# Patient Record
Sex: MALE | Race: WHITE | NOT HISPANIC OR LATINO | Employment: FULL TIME | URBAN - NONMETROPOLITAN AREA
[De-identification: names, ages, dates, MRNs, and addresses within clinical notes are randomized per-mention and may not be internally consistent; named-entity substitution may affect disease eponyms.]

---

## 2017-05-05 ENCOUNTER — APPOINTMENT (OUTPATIENT)
Dept: MEDICAL GROUP | Facility: PHYSICIAN GROUP | Age: 27
End: 2017-05-05
Payer: COMMERCIAL

## 2017-05-12 ENCOUNTER — OFFICE VISIT (OUTPATIENT)
Dept: MEDICAL GROUP | Facility: PHYSICIAN GROUP | Age: 27
End: 2017-05-12
Payer: COMMERCIAL

## 2017-05-12 VITALS
BODY MASS INDEX: 24.55 KG/M2 | SYSTOLIC BLOOD PRESSURE: 132 MMHG | TEMPERATURE: 98.6 F | DIASTOLIC BLOOD PRESSURE: 76 MMHG | HEART RATE: 86 BPM | HEIGHT: 68 IN | OXYGEN SATURATION: 97 % | RESPIRATION RATE: 16 BRPM | WEIGHT: 162 LBS

## 2017-05-12 DIAGNOSIS — K21.00 REFLUX ESOPHAGITIS: ICD-10-CM

## 2017-05-12 DIAGNOSIS — F33.2 SEVERE EPISODE OF RECURRENT MAJOR DEPRESSIVE DISORDER, WITHOUT PSYCHOTIC FEATURES (HCC): ICD-10-CM

## 2017-05-12 DIAGNOSIS — F17.200 TOBACCO USE DISORDER: ICD-10-CM

## 2017-05-12 DIAGNOSIS — F41.9 ANXIETY: ICD-10-CM

## 2017-05-12 PROBLEM — F32.A DEPRESSION: Status: ACTIVE | Noted: 2017-05-12

## 2017-05-12 PROCEDURE — 99214 OFFICE O/P EST MOD 30 MIN: CPT | Performed by: NURSE PRACTITIONER

## 2017-05-12 RX ORDER — HYDROXYZINE HYDROCHLORIDE 25 MG/1
25 TABLET, FILM COATED ORAL 3 TIMES DAILY PRN
Qty: 30 TAB | Refills: 1 | Status: SHIPPED | OUTPATIENT
Start: 2017-05-12 | End: 2017-08-17 | Stop reason: SDUPTHER

## 2017-05-12 RX ORDER — OMEPRAZOLE 20 MG/1
20 CAPSULE, DELAYED RELEASE ORAL DAILY
Qty: 90 CAP | Refills: 1 | Status: SHIPPED | OUTPATIENT
Start: 2017-05-12

## 2017-05-12 RX ORDER — RANITIDINE 150 MG/1
150 TABLET ORAL 2 TIMES DAILY
COMMUNITY

## 2017-05-12 RX ORDER — CITALOPRAM HYDROBROMIDE 10 MG/1
10 TABLET ORAL DAILY
Qty: 90 TAB | Refills: 0 | Status: SHIPPED | OUTPATIENT
Start: 2017-05-12 | End: 2017-08-17 | Stop reason: SDUPTHER

## 2017-05-12 NOTE — ASSESSMENT & PLAN NOTE
This is chronic, uncontrolled. However, he has significantly weaned himself down to a pack of cigarettes that last him 4 days. He is not interested and Chantix or any other tobacco cessation product as he feels he can do this on his own with weaning down. He does understand the risks associated with ongoing tobacco use and we'll continue to wean down until he completely stops. I told him that if he finds he has difficulty completely stopping, he can come back and see me.

## 2017-05-12 NOTE — ASSESSMENT & PLAN NOTE
This is chronic in nature, uncontrolled. Patient has symptoms consistent with reflux esophagitis. He has been taking over-the-counter Zantac 150 mg, but not consistently. He also takes Tums when needed. He has been suffering from uncontrolled depression lately and feels that much of his symptoms have been exacerbated from this as well. Upon examination he does have epigastric tenderness does complain of heartburn at times.  Going to have him switch to omeprazole, 20 mg daily. He is instructed to take this on an empty stomach first thing in the morning. He was also given printed education material on foods and activities to avoid for reflux. We will see him back in 2 months for follow-up.

## 2017-05-12 NOTE — ASSESSMENT & PLAN NOTE
Patient does have past history of depression, treated and adolescence. He was on citalopram, but cannot remember whether or not it was infected. During his adolescence, his older brother who is a father figure to him committed suicide, and this significantly affected him. It has been a number years since he has been treated for this. He has intention yes to both questions on the PHQ-2 questionnaire. Reports that this is also causing gastrointestinal symptoms and he is not eating well. In fact he states he is losing weight because he just simply does not have an appetite.  He has had suicidal ideations in the past, but does not have any now. More than anything, he states he just wants to be alone and away from other people. He also denies homicidal ideations, hallucinations, racing thoughts and flights of ideas. He is open to counseling, referral has been placed. I will have him start on citalopram again, 10 mg daily. I did discuss with him the risks, benefits and side effects of medication. He was also reminded the medication takes 4-6 weeks to become fully effective. In the interim I will have him take hydroxyzine 25 mg, one pill up to 3 times a day as needed for breakthrough anxiety. He can take the last pill of the day for insomnia at bedtime. He was also counseled on the risks, benefits and side effects of this medication, and warned to watch for sedation. He is not to drive with this medication or do any other activities that require mental acuity. He is follow-up with me in 2 months.

## 2017-05-12 NOTE — PATIENT INSTRUCTIONS
Referral for counseling    Start Celexa 10 mg daily    Hydroxyzine 25 mg 1 pill up to 3 times as needed for anxiety--take last one of the day for insomnia    Continue to work on tobacco cessation    Omeprazole 20 mg daily--take on empty stomach first thing in am    Follow up with me in 2 months    Gastroesophageal Reflux Disease, Adult  Gastroesophageal reflux disease (GERD) happens when acid from your stomach flows up into the esophagus. When acid comes in contact with the esophagus, the acid causes soreness (inflammation) in the esophagus. Over time, GERD may create small holes (ulcers) in the lining of the esophagus.  CAUSES   · Increased body weight. This puts pressure on the stomach, making acid rise from the stomach into the esophagus.  · Smoking. This increases acid production in the stomach.  · Drinking alcohol. This causes decreased pressure in the lower esophageal sphincter (valve or ring of muscle between the esophagus and stomach), allowing acid from the stomach into the esophagus.  · Late evening meals and a full stomach. This increases pressure and acid production in the stomach.  · A malformed lower esophageal sphincter.  Sometimes, no cause is found.  SYMPTOMS   · Burning pain in the lower part of the mid-chest behind the breastbone and in the mid-stomach area. This may occur twice a week or more often.  · Trouble swallowing.  · Sore throat.  · Dry cough.  · Asthma-like symptoms including chest tightness, shortness of breath, or wheezing.  DIAGNOSIS   Your caregiver may be able to diagnose GERD based on your symptoms. In some cases, X-rays and other tests may be done to check for complications or to check the condition of your stomach and esophagus.  TREATMENT   Your caregiver may recommend over-the-counter or prescription medicines to help decrease acid production. Ask your caregiver before starting or adding any new medicines.   HOME CARE INSTRUCTIONS   · Change the factors that you can control.  Ask your caregiver for guidance concerning weight loss, quitting smoking, and alcohol consumption.  · Avoid foods and drinks that make your symptoms worse, such as:  ¨ Caffeine or alcoholic drinks.  ¨ Chocolate.  ¨ Peppermint or mint flavorings.  ¨ Garlic and onions.  ¨ Spicy foods.  ¨ Citrus fruits, such as oranges, rocco, or limes.  ¨ Tomato-based foods such as sauce, chili, salsa, and pizza.  ¨ Fried and fatty foods.  · Avoid lying down for the 3 hours prior to your bedtime or prior to taking a nap.  · Eat small, frequent meals instead of large meals.  · Wear loose-fitting clothing. Do not wear anything tight around your waist that causes pressure on your stomach.  · Raise the head of your bed 6 to 8 inches with wood blocks to help you sleep. Extra pillows will not help.  · Only take over-the-counter or prescription medicines for pain, discomfort, or fever as directed by your caregiver.  · Do not take aspirin, ibuprofen, or other nonsteroidal anti-inflammatory drugs (NSAIDs).  SEEK IMMEDIATE MEDICAL CARE IF:   · You have pain in your arms, neck, jaw, teeth, or back.  · Your pain increases or changes in intensity or duration.  · You develop nausea, vomiting, or sweating (diaphoresis).  · You develop shortness of breath, or you faint.  · Your vomit is green, yellow, black, or looks like coffee grounds or blood.  · Your stool is red, bloody, or black.  These symptoms could be signs of other problems, such as heart disease, gastric bleeding, or esophageal bleeding.  MAKE SURE YOU:   · Understand these instructions.  · Will watch your condition.  · Will get help right away if you are not doing well or get worse.     This information is not intended to replace advice given to you by your health care provider. Make sure you discuss any questions you have with your health care provider.     Document Released: 09/27/2006 Document Revised: 01/08/2016 Document Reviewed: 04/13/2016  [employer] Patient Education  ©2016 [employer]

## 2017-05-12 NOTE — PROGRESS NOTES
Chief Complaint   Patient presents with   • Loss of Appetite     diarrhea x3 years intermittent, heartburn   • Panic Attack     anxiety x 3 years, getting worse         This is a 26 y.o.male patient that presents today with the following: Follow-up visit, acute and chronic conditions    Tobacco use disorder  This is chronic, uncontrolled. However, he has significantly weaned himself down to a pack of cigarettes that last him 4 days. He is not interested and Chantix or any other tobacco cessation product as he feels he can do this on his own with weaning down. He does understand the risks associated with ongoing tobacco use and we'll continue to wean down until he completely stops. I told him that if he finds he has difficulty completely stopping, he can come back and see me.    Reflux esophagitis  This is chronic in nature, uncontrolled. Patient has symptoms consistent with reflux esophagitis. He has been taking over-the-counter Zantac 150 mg, but not consistently. He also takes Tums when needed. He has been suffering from uncontrolled depression lately and feels that much of his symptoms have been exacerbated from this as well. Upon examination he does have epigastric tenderness does complain of heartburn at times.  Going to have him switch to omeprazole, 20 mg daily. He is instructed to take this on an empty stomach first thing in the morning. He was also given printed education material on foods and activities to avoid for reflux. We will see him back in 2 months for follow-up.    Depression  Patient does have past history of depression, treated and adolescence. He was on citalopram, but cannot remember whether or not it was infected. During his adolescence, his older brother who is a father figure to him committed suicide, and this significantly affected him. It has been a number years since he has been treated for this. He has intention yes to both questions on the PHQ-2 questionnaire. Reports that this is also  causing gastrointestinal symptoms and he is not eating well. In fact he states he is losing weight because he just simply does not have an appetite.  He has had suicidal ideations in the past, but does not have any now. More than anything, he states he just wants to be alone and away from other people. He also denies homicidal ideations, hallucinations, racing thoughts and flights of ideas. He is open to counseling, referral has been placed. I will have him start on citalopram again, 10 mg daily. I did discuss with him the risks, benefits and side effects of medication. He was also reminded the medication takes 4-6 weeks to become fully effective. In the interim I will have him take hydroxyzine 25 mg, one pill up to 3 times a day as needed for breakthrough anxiety. He can take the last pill of the day for insomnia at bedtime. He was also counseled on the risks, benefits and side effects of this medication, and warned to watch for sedation. He is not to drive with this medication or do any other activities that require mental acuity. He is follow-up with me in 2 months.    Anxiety  See additional notes on depression.      No visits with results within 1 Month(s) from this visit.  Latest known visit with results is:    Admission on 01/12/2016, Discharged on 01/12/2016   Component Date Value   • WBC 01/12/2016 10.6    • RBC 01/12/2016 5.15    • Hemoglobin 01/12/2016 15.8    • Hematocrit 01/12/2016 45.5    • MCV 01/12/2016 88.3    • MCH 01/12/2016 30.7    • MCHC 01/12/2016 34.7    • RDW 01/12/2016 43.2    • Platelet Count 01/12/2016 216    • MPV 01/12/2016 10.0    • Neutrophils-Polys 01/12/2016 74.10*   • Lymphocytes 01/12/2016 19.60*   • Monocytes 01/12/2016 4.90    • Eosinophils 01/12/2016 0.40    • Basophils 01/12/2016 0.60    • Immature Granulocytes 01/12/2016 0.40    • Nucleated RBC 01/12/2016 0.00    • Neutrophils (Absolute) 01/12/2016 7.83*   • Lymphs (Absolute) 01/12/2016 2.07    • Monos (Absolute) 01/12/2016  0.52    • Eos (Absolute) 01/12/2016 0.04    • Baso (Absolute) 01/12/2016 0.06    • Immature Granulocytes (a* 01/12/2016 0.04    • NRBC (Absolute) 01/12/2016 0.00    • Sodium 01/12/2016 136    • Potassium 01/12/2016 3.8    • Chloride 01/12/2016 105    • Co2 01/12/2016 22    • Anion Gap 01/12/2016 9.0    • Glucose 01/12/2016 92    • Bun 01/12/2016 13    • Creatinine 01/12/2016 0.91    • Calcium 01/12/2016 9.8    • AST(SGOT) 01/12/2016 16    • ALT(SGPT) 01/12/2016 21    • Alkaline Phosphatase 01/12/2016 58    • Total Bilirubin 01/12/2016 0.5    • Albumin 01/12/2016 5.1*   • Total Protein 01/12/2016 8.0    • Globulin 01/12/2016 2.9    • A-G Ratio 01/12/2016 1.8    • Lipase 01/12/2016 12    • GFR If  01/12/2016 >60    • GFR If Non  Ameri* 01/12/2016 >60    • POC Color 01/12/2016 Yellow    • POC Appearance 01/12/2016 Clear    • POC Glucose 01/12/2016 Negative    • POC Ketones 01/12/2016 Negative    • POC Specific Gravity 01/12/2016 1.025    • POC Blood 01/12/2016 Negative    • POC Urine PH 01/12/2016 5.5    • POC Protein 01/12/2016 Negative    • POC Nitrites 01/12/2016 Negative    • POC Leukocyte Esterase 01/12/2016 Negative          clinical course has been stable    Past Medical History   Diagnosis Date   • Depression    • Reflux esophagitis        Past Surgical History   Procedure Laterality Date   • Other       none reported       Family History   Problem Relation Age of Onset   • Cancer Mother    • Heart Disease Mother    • Heart Attack Father        Review of patient's allergies indicates no known allergies.    Current Outpatient Prescriptions Ordered in [facility]   Medication Sig Dispense Refill   • citalopram (CELEXA) 10 MG tablet Take 1 Tab by mouth every day. 90 Tab 0   • hydrOXYzine (ATARAX) 25 MG Tab Take 1 Tab by mouth 3 times a day as needed (anxiety). 30 Tab 1   • omeprazole (PRILOSEC) 20 MG delayed-release capsule Take 1 Cap by mouth every day. 90 Cap 1   • ibuprofen (MOTRIN) 200 MG Tab  "Take 200 mg by mouth every 6 hours as needed.     • calcium carbonate (TUMS) 500 MG Chew Tab Take 500 mg by mouth every day.     • ranitidine (ZANTAC) 150 MG Tab Take 150 mg by mouth 2 times a day.       No current [facility]-ordered facility-administered medications on file.       Constitutional ROS: No unexpected change in weight, No weakness, Positive for weight loss, reported, per history of present illness  Pulmonary ROS: No chronic cough, sputum, or hemoptysis, No shortness of breath, No recent change in breathing, Positive for smoker, current every day  Cardiovascular ROS: No chest pain, No edema, No palpitations  Gastrointestinal ROS: Positive per history of present illness  Musculoskeletal/Extremities ROS: No clubbing, No peripheral edema, No pain, redness or swelling on the joints  Neurologic ROS: Normal development, No seizures, No weakness  Psychiatric ROS: Positive per history of present illness    Physical exam:  /76 mmHg  Pulse 86  Temp(Src) 37 °C (98.6 °F)  Resp 16  Ht 1.727 m (5' 7.99\")  Wt 73.483 kg (162 lb)  BMI 24.64 kg/m2  SpO2 97%  General Appearance: Young male, alert, no distress, well-nourished, well-groomed  Skin: Skin color, texture, turgor normal. No rashes or lesions.  Lungs: negative findings: normal respiratory rate and rhythm, lungs clear to auscultation  Heart: negative. RRR without murmur, gallop, or rubs.  No ectopy.  Abdomen: Abdomen soft, mild tenderness with palpation to epigastric area. BS normal. No masses,  No organomegaly  Musculoskeletal: negative findings: no erythema, induration, or nodules, ROM of all joints is normal, no evidence of joint instability, strength normal, no deformities present  Neurologic: intact, oriented, mood appropriate, judgment intact. Cranial nerves II through XII grossly intact    Medical decision making/discussion: Patient here for symptoms of depression, uncontrolled. We will start him on citalopram, 10 mg daily along with hydroxyzine 25 " mg one pill up to 3 times a day as needed for breakthrough anxiety as well as insomnia. He was counseled on risks, benefits and side effects of both medications. He was told that citalopram can take 4-6 weeks to become fully effective. He is to continue with healthy living, regular physical activity and healthy diet. We will place referral for counseling as well. He is to continue with decreasing his tobacco use until he completely stops. He can come back persistence if needed. For reflux, I am going to have him switch to Prilosec, 20 mg daily. He is to take this on an empty stomach first thing in the morning. He was given printed educational material on foods and activities to avoid. I will see him back in 2 months    Delfino was seen today for loss of appetite and panic attack.    Diagnoses and all orders for this visit:    Severe episode of recurrent major depressive disorder, without psychotic features (CMS-HCC)  -     REFERRAL TO PSYCHOLOGY  -     citalopram (CELEXA) 10 MG tablet; Take 1 Tab by mouth every day.    Tobacco use disorder    Anxiety  -     REFERRAL TO PSYCHOLOGY  -     citalopram (CELEXA) 10 MG tablet; Take 1 Tab by mouth every day.  -     hydrOXYzine (ATARAX) 25 MG Tab; Take 1 Tab by mouth 3 times a day as needed (anxiety).    Reflux esophagitis    Other orders  -     omeprazole (PRILOSEC) 20 MG delayed-release capsule; Take 1 Cap by mouth every day.          Please note that this dictation was created using voice recognition software. I have made every reasonable attempt to correct obvious errors, but I expect that there are errors of grammar and possibly content that I did not discover before finalizing the note.

## 2017-05-26 NOTE — MR AVS SNAPSHOT
"        Delfino Daniel   2017 11:00 AM   Office Visit   MRN: [id number]    Department:  [facility]   Dept Phone:  [phone]    Description:  Male : 1990   Provider:  RIDGE Teran           Reason for Visit     Loss of Appetite diarrhea x3 years intermittent, heartburn    Panic Attack anxiety x 3 years, getting worse      Allergies as of 2017     No Known Allergies      You were diagnosed with     Tobacco use disorder   [305.1.ICD-9-CM]       Severe episode of recurrent major depressive disorder, without psychotic features ([facility])   [9398342]       Anxiety   [562973]       Reflux esophagitis   [530.11.ICD-9-CM]         Vital Signs     Blood Pressure Pulse Temperature Respirations Height Weight    132/76 mmHg 86 37 °C (98.6 °F) 16 1.727 m (5' 7.99\") 73.483 kg (162 lb)    Body Mass Index Oxygen Saturation Smoking Status             24.64 kg/m2 97% Current Every Day Smoker         Basic Information     Date Of Birth Sex Race Ethnicity Preferred Language    1990 Male White Non- English      Problem List              ICD-10-CM Priority Class Noted - Resolved    Family history of early CAD Z82.49   3/9/2016 - Present    Wellness examination Z00.00   3/9/2016 - Present    Tobacco use disorder F17.200   2017 - Present    Depression F32.9   2017 - Present    Anxiety F41.9   2017 - Present      Health Maintenance        Date Due Completion Dates    IMM HEP B VACCINE (1 of 3 - Primary Series) 1990 ---    IMM HEP A VACCINE (1 of 2 - Standard Series) 1991 ---    IMM HPV VACCINE (1 of 3 - Male 3 Dose Series) 2001 ---    IMM VARICELLA (CHICKENPOX) VACCINE (1 of 2 - 2 Dose Adolescent Series) 2003 ---    IMM DTaP/Tdap/Td Vaccine (1 - Tdap) 2009 ---            Current Immunizations     Influenza Vaccine Quad Inj (Pf) 3/9/2016 11:30 AM      Below and/or attached are the medications your provider expects you to take. Review all of your home " medications and newly ordered medications with your provider and/or pharmacist. Follow medication instructions as directed by your provider and/or pharmacist. Please keep your medication list with you and share with your provider. Update the information when medications are discontinued, doses are changed, or new medications (including over-the-counter products) are added; and carry medication information at all times in the event of emergency situations     Allergies:  No Known Allergies          Medications  Valid as of: May 12, 2017 - 11:43 AM    Generic Name Brand Name Tablet Size Instructions for use    Calcium Carbonate Antacid (Chew Tab) TUMS 500 MG Take 500 mg by mouth every day.        Citalopram Hydrobromide (Tab) CELEXA 10 MG Take 1 Tab by mouth every day.        HydrOXYzine HCl (Tab) ATARAX 25 MG Take 1 Tab by mouth 3 times a day as needed (anxiety).        Ibuprofen (Tab) MOTRIN 200 MG Take 200 mg by mouth every 6 hours as needed.        Omeprazole (CAPSULE DELAYED RELEASE) PRILOSEC 20 MG Take 1 Cap by mouth every day.        RaNITidine HCl (Tab) ZANTAC 150 MG Take 150 mg by mouth 2 times a day.        .                 Medicines prescribed today were sent to:     [facility] [address] [place] [address] - [address] [facility] [address] [facility] [address]    [address] [facility] [address] [facility] [address]    Phone: [phone] Fax: [phone]    Open 24 Hours?: No      Medication refill instructions:       If your prescription bottle indicates you have medication refills left, it is not necessary to call your provider’s office. Please contact your pharmacy and they will refill your medication.    If your prescription bottle indicates you do not have any refills left, you may request refills at any time through one of the following ways: The online [employer]ra system (except Urgent Care), by calling your provider’s office, or by asking your pharmacy to contact your provider’s office with a refill request.  Medication refills are processed only during regular business hours and may not be available until the next business day. Your provider may request additional information or to have a follow-up visit with you prior to refilling your medication.   *Please Note: Medication refills are assigned a new Rx number when refilled electronically. Your pharmacy may indicate that no refills were authorized even though a new prescription for the same medication is available at the pharmacy. Please request the medicine by name with the pharmacy before contacting your provider for a refill.        Referral     A referral request has been sent to our patient care coordination department. Please allow 3-5 business days for us to process this request and contact you either by phone or mail. If you do not hear from us by the 5th business day, please call us at [phone].        Instructions    Referral for counseling    Start Celexa 10 mg daily    Hydroxyzine 25 mg 1 pill up to 3 times as needed for anxiety--take last one of the day for insomnia    Continue to work on tobacco cessation    Omeprazole 20 mg daily--take on empty stomach first thing in am    Follow up with me in 2 months    Gastroesophageal Reflux Disease, Adult  Gastroesophageal reflux disease (GERD) happens when acid from your stomach flows up into the esophagus. When acid comes in contact with the esophagus, the acid causes soreness (inflammation) in the esophagus. Over time, GERD may create small holes (ulcers) in the lining of the esophagus.  CAUSES   · Increased body weight. This puts pressure on the stomach, making acid rise from the stomach into the esophagus.  · Smoking. This increases acid production in the stomach.  · Drinking alcohol. This causes decreased pressure in the lower esophageal sphincter (valve or ring of muscle between the esophagus and stomach), allowing acid from the stomach into the esophagus.  · Late evening meals and a full stomach. This  increases pressure and acid production in the stomach.  · A malformed lower esophageal sphincter.  Sometimes, no cause is found.  SYMPTOMS   · Burning pain in the lower part of the mid-chest behind the breastbone and in the mid-stomach area. This may occur twice a week or more often.  · Trouble swallowing.  · Sore throat.  · Dry cough.  · Asthma-like symptoms including chest tightness, shortness of breath, or wheezing.  DIAGNOSIS   Your caregiver may be able to diagnose GERD based on your symptoms. In some cases, X-rays and other tests may be done to check for complications or to check the condition of your stomach and esophagus.  TREATMENT   Your caregiver may recommend over-the-counter or prescription medicines to help decrease acid production. Ask your caregiver before starting or adding any new medicines.   HOME CARE INSTRUCTIONS   · Change the factors that you can control. Ask your caregiver for guidance concerning weight loss, quitting smoking, and alcohol consumption.  · Avoid foods and drinks that make your symptoms worse, such as:  ¨ Caffeine or alcoholic drinks.  ¨ Chocolate.  ¨ Peppermint or mint flavorings.  ¨ Garlic and onions.  ¨ Spicy foods.  ¨ Citrus fruits, such as oranges, rocco, or limes.  ¨ Tomato-based foods such as sauce, chili, salsa, and pizza.  ¨ Fried and fatty foods.  · Avoid lying down for the 3 hours prior to your bedtime or prior to taking a nap.  · Eat small, frequent meals instead of large meals.  · Wear loose-fitting clothing. Do not wear anything tight around your waist that causes pressure on your stomach.  · Raise the head of your bed 6 to 8 inches with wood blocks to help you sleep. Extra pillows will not help.  · Only take over-the-counter or prescription medicines for pain, discomfort, or fever as directed by your caregiver.  · Do not take aspirin, ibuprofen, or other nonsteroidal anti-inflammatory drugs (NSAIDs).  SEEK IMMEDIATE MEDICAL CARE IF:   · You have pain in your  arms, neck, jaw, teeth, or back.  · Your pain increases or changes in intensity or duration.  · You develop nausea, vomiting, or sweating (diaphoresis).  · You develop shortness of breath, or you faint.  · Your vomit is green, yellow, black, or looks like coffee grounds or blood.  · Your stool is red, bloody, or black.  These symptoms could be signs of other problems, such as heart disease, gastric bleeding, or esophageal bleeding.  MAKE SURE YOU:   · Understand these instructions.  · Will watch your condition.  · Will get help right away if you are not doing well or get worse.     This information is not intended to replace advice given to you by your health care provider. Make sure you discuss any questions you have with your health care provider.     Document Released: 09/27/2006 Document Revised: 01/08/2016 Document Reviewed: 04/13/2016  [employer] Interactive Patient Education ©2016 [employer]            [email or web address] Access Code: [email or web address]  Expires: 5/26/2017 11:22 AM    RunSignUp.com  A secure, online tool to manage your health information     [email or web address]’s [email or web address]® is a secure, online tool that connects you to your personalized health information from the privacy of your home -- day or night - making it very easy for you to manage your healthcare. Once the activation process is completed, you can even access your medical information using the RunSignUp.com abimael, which is available for free in the Apple Abimael store or Google Play store.     RunSignUp.com provides the following levels of access (as shown below):   My Chart Features   [facility] Doctor [facility]  Specialists [facility]  Urgent  Care [facility]  Primary Care  Doctor   Email your healthcare team securely and privately 24/7 X X X    Manage appointments: schedule your next appointment; view details of past/upcoming appointments X      Request prescription refills. X      View recent personal medical records, including lab and immunizations X X X X   View health  done record, including health history, allergies, medications X X X X   Read reports about your outpatient visits, procedures, consult and ER notes X X X X   See your discharge summary, which is a recap of your hospital and/or ER visit that includes your diagnosis, lab results, and care plan. X X       How to register for DINKlife:  1. Go to  [email or web address].  2. Click on the Sign Up Now box, which takes you to the New Member Sign Up page. You will need to provide the following information:  a. Enter your DINKlife Access Code exactly as it appears at the top of this page. (You will not need to use this code after you’ve completed the sign-up process. If you do not sign up before the expiration date, you must request a new code.)   b. Enter your date of birth.   c. Enter your home email address.   d. Click Submit, and follow the next screen’s instructions.  3. Create a DINKlife ID. This will be your DINKlife login ID and cannot be changed, so think of one that is secure and easy to remember.  4. Create a DINKlife password. You can change your password at any time.  5. Enter your Password Reset Question and Answer. This can be used at a later time if you forget your password.   6. Enter your e-mail address. This allows you to receive e-mail notifications when new information is available in DINKlife.  7. Click Sign Up. You can now view your health information.    For assistance activating your DINKlife account, call [phone]        Quit Tobacco Information     Do you want to quit using tobacco?    Quitting tobacco decreases risks of cancer, heart and lung disease, increases life expectancy, improves sense of taste and smell, and increases spending money, among other benefits.    If you are thinking about quitting, we can help.  • [facility] Quit Tobacco Program: [phone]  o Program occurs weekly for four weeks and includes pharmacist consultation on products to support quitting smoking or chewing tobacco. A  provider referral is needed for pharmacist consultation.  • Tobacco Users Help Hotline: 5-901-QUIT-NOW (498-4272) or https://[place].quitlogix.org/  o Free, confidential telephone and online coaching for [place] residents. Sessions are designed on a schedule that is convenient for you. Eligible clients receive free nicotine replacement therapy.  • Nationally: www.smokefree.gov  o Information and professional assistance to support both immediate and long-term needs as you become, and remain, a non-smoker. Smokefree.gov allows you to choose the help that best fits your needs.

## 2017-08-17 ENCOUNTER — TELEPHONE (OUTPATIENT)
Dept: VASCULAR LAB | Facility: MEDICAL CENTER | Age: 27
End: 2017-08-17

## 2017-08-17 DIAGNOSIS — F41.9 ANXIETY: ICD-10-CM

## 2017-08-17 DIAGNOSIS — F33.2 SEVERE EPISODE OF RECURRENT MAJOR DEPRESSIVE DISORDER, WITHOUT PSYCHOTIC FEATURES (HCC): ICD-10-CM

## 2017-08-17 RX ORDER — CITALOPRAM HYDROBROMIDE 10 MG/1
10 TABLET ORAL DAILY
Qty: 90 TAB | Refills: 0 | Status: SHIPPED | OUTPATIENT
Start: 2017-08-17

## 2017-08-17 RX ORDER — HYDROXYZINE HYDROCHLORIDE 25 MG/1
25 TABLET, FILM COATED ORAL 3 TIMES DAILY PRN
Qty: 30 TAB | Refills: 1 | Status: SHIPPED | OUTPATIENT
Start: 2017-08-17

## 2017-08-17 NOTE — TELEPHONE ENCOUNTER
Refilled x 3 months. Please advise patient to schedule follow-up for future fills.   Mishel Gatica, PHARMD